# Patient Record
Sex: MALE | Race: WHITE | NOT HISPANIC OR LATINO | Employment: FULL TIME | URBAN - NONMETROPOLITAN AREA
[De-identification: names, ages, dates, MRNs, and addresses within clinical notes are randomized per-mention and may not be internally consistent; named-entity substitution may affect disease eponyms.]

---

## 2023-10-23 ENCOUNTER — OFFICE VISIT (OUTPATIENT)
Dept: PODIATRY | Age: 38
End: 2023-10-23
Payer: COMMERCIAL

## 2023-10-23 VITALS
OXYGEN SATURATION: 99 % | TEMPERATURE: 98.6 F | SYSTOLIC BLOOD PRESSURE: 138 MMHG | DIASTOLIC BLOOD PRESSURE: 80 MMHG | BODY MASS INDEX: 25.93 KG/M2 | WEIGHT: 185.2 LBS | HEIGHT: 71 IN | HEART RATE: 80 BPM

## 2023-10-23 DIAGNOSIS — B35.1 ONYCHOMYCOSIS: Primary | ICD-10-CM

## 2023-10-23 PROCEDURE — 99203 OFFICE O/P NEW LOW 30 MIN: CPT | Performed by: STUDENT IN AN ORGANIZED HEALTH CARE EDUCATION/TRAINING PROGRAM

## 2023-10-23 NOTE — PROGRESS NOTES
Assessment/Plan:     Diagnoses and all orders for this visit:    Onychomycosis  -     Hepatic function panel; Future  -     Hepatic function panel; Future           IMPRESSION:  Right toenail fungus     PLAN:  I reviewed clinical exam with patient in detail today. I have discussed with the patient the pathophysiology of this diagnosis and reviewed how the examination correlates with this diagnosis. I discussed treatment options for toenail fungus with patient in great detail today. He has decided on oral terbinafine. I will rx 250mg/day for 90 days. He will get LFTs before tx and one month into tx - I will send rx once I get these results. We discussed that nails take anywhere from 6-12 months to fully grow out and thus she should continue to see improvement in distal nails as time progresses. I discussed that even with sufficient oral treatment, cure rate is not 100% and there is high chance of recurrent toenail fungal infection even if cure is achieved. I discussed general foot hygiene such as keep feet clean and dry, wearing clean socks, washing socks in hot water + laundry , cleaning showers regularly, avoiding keeping shoes in dark/wark spots, using antifungal shoe spray, alternating shoes or replacing shoes all together  F/u in 3 months (penlac rx there after)     Subjective:      Patient ID: Yaya Delarosa is a 41 y.o. male. Emily Drew presents to clinic today concerning right foot toenail dystrophy present for years. No prior tx. The following portions of the patient's history were reviewed and updated as appropriate: allergies, current medications, past family history, past medical history, past social history, past surgical history, and problem list.    Review of Systems   Constitutional:  Negative for activity change, chills and fever. HENT: Negative. Respiratory:  Negative for cough, chest tightness and shortness of breath.     Cardiovascular:  Negative for chest pain and leg swelling. Endocrine: Negative. Genitourinary: Negative. Skin:         R toenail dystrophy   Neurological: Negative. Negative for numbness. Psychiatric/Behavioral: Negative. Negative for agitation and behavioral problems. Objective:      /80 (BP Location: Left arm, Patient Position: Sitting, Cuff Size: Standard)   Pulse 80   Temp 98.6 °F (37 °C)   Ht 5' 11" (1.803 m)   Wt 84 kg (185 lb 3.2 oz)   SpO2 99%   BMI 25.83 kg/m²          Physical Exam  Constitutional:       General: He is not in acute distress. Appearance: Normal appearance. He is not ill-appearing. Cardiovascular:      Pulses: Normal pulses. Comments: Bilateral DP & PT pulses 2/4. CRT WNL. Pedal hair present. Feet warm and well perfused. Pulmonary:      Effort: No respiratory distress. Musculoskeletal:      Comments: Bilateral ankle, STJ, TNJ, TMTJ, MTPJ ROM WNL and without pain. LE muscle strength WNL. Skin:     General: Skin is warm. Capillary Refill: Capillary refill takes less than 2 seconds. Findings: No erythema or lesion. Comments: Right foot toenail x5 thickened, yellowed with subungual debris and brittleness   Neurological:      General: No focal deficit present. Mental Status: He is alert and oriented to person, place, and time. Sensory: No sensory deficit. Comments: Gross sensation intact. Denies N/T/B to B/L feet.     Psychiatric:         Mood and Affect: Mood normal.         Behavior: Behavior normal.